# Patient Record
Sex: FEMALE | URBAN - METROPOLITAN AREA
[De-identification: names, ages, dates, MRNs, and addresses within clinical notes are randomized per-mention and may not be internally consistent; named-entity substitution may affect disease eponyms.]

---

## 2017-01-26 ENCOUNTER — OFFICE VISIT - HEALTHEAST (OUTPATIENT)
Dept: FAMILY MEDICINE | Facility: CLINIC | Age: 47
End: 2017-01-26

## 2017-01-26 DIAGNOSIS — E66.9 OBESITY: ICD-10-CM

## 2017-01-26 DIAGNOSIS — Z01.419 WELL FEMALE EXAM WITH ROUTINE GYNECOLOGICAL EXAM: ICD-10-CM

## 2017-01-26 DIAGNOSIS — Z00.00 HEALTH CARE MAINTENANCE: ICD-10-CM

## 2017-01-26 DIAGNOSIS — I10 HYPERTENSION: ICD-10-CM

## 2017-01-26 DIAGNOSIS — Z12.31 ENCOUNTER FOR SCREENING MAMMOGRAM FOR BREAST CANCER: ICD-10-CM

## 2017-01-26 DIAGNOSIS — M54.9 BACK PAIN: ICD-10-CM

## 2017-01-26 LAB
CHOLEST SERPL-MCNC: 202 MG/DL
FASTING STATUS PATIENT QL REPORTED: YES
HDLC SERPL-MCNC: 38 MG/DL
LDLC SERPL CALC-MCNC: 92 MG/DL
TRIGL SERPL-MCNC: 361 MG/DL

## 2017-01-26 ASSESSMENT — MIFFLIN-ST. JEOR: SCORE: 1246.37

## 2017-01-27 ENCOUNTER — COMMUNICATION - HEALTHEAST (OUTPATIENT)
Dept: FAMILY MEDICINE | Facility: CLINIC | Age: 47
End: 2017-01-27

## 2017-01-27 DIAGNOSIS — E03.9 HYPOTHYROIDISM: ICD-10-CM

## 2017-02-01 LAB
BKR LAB AP ABNORMAL BLEEDING: NO
BKR LAB AP BIRTH CONTROL/HORMONES: NORMAL
BKR LAB AP CERVICAL APPEARANCE: NORMAL
BKR LAB AP GYN ADEQUACY: NORMAL
BKR LAB AP GYN INTERPRETATION: NORMAL
BKR LAB AP HPV REFLEX: NORMAL
BKR LAB AP LMP: NORMAL
BKR LAB AP PATIENT STATUS: NORMAL
BKR LAB AP PREVIOUS ABNORMAL: NORMAL
BKR LAB AP PREVIOUS NORMAL: NORMAL
HIGH RISK?: NO
HPV INTERPRETATION - HISTORICAL: NORMAL
HPV INTERPRETER - HISTORICAL: NORMAL
PATH REPORT.COMMENTS IMP SPEC: NORMAL
RESULT FLAG (HE HISTORICAL CONVERSION): NORMAL

## 2017-02-02 ENCOUNTER — COMMUNICATION - HEALTHEAST (OUTPATIENT)
Dept: FAMILY MEDICINE | Facility: CLINIC | Age: 47
End: 2017-02-02

## 2017-02-02 ENCOUNTER — AMBULATORY - HEALTHEAST (OUTPATIENT)
Dept: NURSING | Facility: CLINIC | Age: 47
End: 2017-02-02

## 2017-02-14 ENCOUNTER — COMMUNICATION - HEALTHEAST (OUTPATIENT)
Dept: TELEHEALTH | Facility: CLINIC | Age: 47
End: 2017-02-14

## 2017-02-14 ENCOUNTER — HOSPITAL ENCOUNTER (OUTPATIENT)
Dept: MAMMOGRAPHY | Facility: HOSPITAL | Age: 47
Discharge: HOME | End: 2017-02-14
Attending: NURSE PRACTITIONER

## 2017-02-14 DIAGNOSIS — Z12.31 ENCOUNTER FOR SCREENING MAMMOGRAM FOR BREAST CANCER: ICD-10-CM

## 2017-03-10 ENCOUNTER — OFFICE VISIT - HEALTHEAST (OUTPATIENT)
Dept: FAMILY MEDICINE | Facility: CLINIC | Age: 47
End: 2017-03-10

## 2017-03-10 ENCOUNTER — COMMUNICATION - HEALTHEAST (OUTPATIENT)
Dept: FAMILY MEDICINE | Facility: CLINIC | Age: 47
End: 2017-03-10

## 2017-03-10 DIAGNOSIS — D50.9 IRON DEFICIENCY ANEMIA: ICD-10-CM

## 2017-03-10 DIAGNOSIS — I10 HYPERTENSION: ICD-10-CM

## 2017-03-10 DIAGNOSIS — E03.9 HYPOTHYROIDISM: ICD-10-CM

## 2017-03-10 ASSESSMENT — MIFFLIN-ST. JEOR: SCORE: 1249.09

## 2017-04-05 ENCOUNTER — AMBULATORY - HEALTHEAST (OUTPATIENT)
Dept: LAB | Facility: CLINIC | Age: 47
End: 2017-04-05

## 2017-04-05 DIAGNOSIS — E03.9 HYPOTHYROIDISM: ICD-10-CM

## 2017-04-05 DIAGNOSIS — D50.9 IRON DEFICIENCY ANEMIA: ICD-10-CM

## 2017-04-05 DIAGNOSIS — I10 HYPERTENSION: ICD-10-CM

## 2018-07-26 ENCOUNTER — OFFICE VISIT - HEALTHEAST (OUTPATIENT)
Dept: FAMILY MEDICINE | Facility: CLINIC | Age: 48
End: 2018-07-26

## 2018-07-26 ENCOUNTER — COMMUNICATION - HEALTHEAST (OUTPATIENT)
Dept: SCHEDULING | Facility: CLINIC | Age: 48
End: 2018-07-26

## 2018-07-26 ENCOUNTER — COMMUNICATION - HEALTHEAST (OUTPATIENT)
Dept: FAMILY MEDICINE | Facility: CLINIC | Age: 48
End: 2018-07-26

## 2018-07-26 DIAGNOSIS — Z12.31 VISIT FOR SCREENING MAMMOGRAM: ICD-10-CM

## 2018-07-26 DIAGNOSIS — D64.9 ANEMIA: ICD-10-CM

## 2018-07-26 DIAGNOSIS — L60.3 DYSTROPHIC NAIL: ICD-10-CM

## 2018-07-26 DIAGNOSIS — Z00.00 HEALTH MAINTENANCE EXAMINATION: ICD-10-CM

## 2018-07-26 DIAGNOSIS — E03.9 HYPOTHYROIDISM: ICD-10-CM

## 2018-07-26 DIAGNOSIS — I10 HYPERTENSION: ICD-10-CM

## 2018-07-26 DIAGNOSIS — I10 ESSENTIAL HYPERTENSION: ICD-10-CM

## 2018-07-26 LAB
ALBUMIN SERPL-MCNC: 3.8 G/DL (ref 3.5–5)
ALP SERPL-CCNC: 100 U/L (ref 45–120)
ALT SERPL W P-5'-P-CCNC: 16 U/L (ref 0–45)
ANION GAP SERPL CALCULATED.3IONS-SCNC: 10 MMOL/L (ref 5–18)
AST SERPL W P-5'-P-CCNC: 15 U/L (ref 0–40)
BASOPHILS # BLD AUTO: 0.1 THOU/UL (ref 0–0.2)
BASOPHILS # BLD AUTO: 0.1 THOU/UL (ref 0–0.2)
BASOPHILS NFR BLD AUTO: 1 % (ref 0–2)
BASOPHILS NFR BLD AUTO: 1 % (ref 0–2)
BILIRUB SERPL-MCNC: 0.3 MG/DL (ref 0–1)
BUN SERPL-MCNC: 11 MG/DL (ref 8–22)
CALCIUM SERPL-MCNC: 9.1 MG/DL (ref 8.5–10.5)
CHLORIDE BLD-SCNC: 104 MMOL/L (ref 98–107)
CHOLEST SERPL-MCNC: 199 MG/DL
CO2 SERPL-SCNC: 22 MMOL/L (ref 22–31)
CREAT SERPL-MCNC: 0.65 MG/DL (ref 0.6–1.1)
EOSINOPHIL # BLD AUTO: 0.1 THOU/UL (ref 0–0.4)
EOSINOPHIL # BLD AUTO: 0.1 THOU/UL (ref 0–0.4)
EOSINOPHIL NFR BLD AUTO: 1 % (ref 0–6)
EOSINOPHIL NFR BLD AUTO: 1 % (ref 0–6)
ERYTHROCYTE [DISTWIDTH] IN BLOOD BY AUTOMATED COUNT: 18.3 % (ref 11–14.5)
ERYTHROCYTE [DISTWIDTH] IN BLOOD BY AUTOMATED COUNT: 19.9 % (ref 11–14.5)
FASTING STATUS PATIENT QL REPORTED: YES
GFR SERPL CREATININE-BSD FRML MDRD: >60 ML/MIN/1.73M2
GLUCOSE BLD-MCNC: 89 MG/DL (ref 70–125)
HCT VFR BLD AUTO: 29.3 % (ref 35–47)
HCT VFR BLD AUTO: 30.9 % (ref 35–47)
HDLC SERPL-MCNC: 38 MG/DL
HGB BLD-MCNC: 8.9 G/DL (ref 12–16)
HGB BLD-MCNC: 9.1 G/DL (ref 12–16)
IRON SATN MFR SERPL: 4 % (ref 20–50)
IRON SERPL-MCNC: 22 UG/DL (ref 42–175)
LDLC SERPL CALC-MCNC: 88 MG/DL
LYMPHOCYTES # BLD AUTO: 3.3 THOU/UL (ref 0.8–4.4)
LYMPHOCYTES # BLD AUTO: 3.3 THOU/UL (ref 0.8–4.4)
LYMPHOCYTES NFR BLD AUTO: 33 % (ref 20–40)
LYMPHOCYTES NFR BLD AUTO: 35 % (ref 20–40)
MCH RBC QN AUTO: 19.2 PG (ref 27–34)
MCH RBC QN AUTO: 20.1 PG (ref 27–34)
MCHC RBC AUTO-ENTMCNC: 29.4 G/DL (ref 32–36)
MCHC RBC AUTO-ENTMCNC: 30.3 G/DL (ref 32–36)
MCV RBC AUTO: 63 FL (ref 80–100)
MCV RBC AUTO: 68 FL (ref 80–100)
MONOCYTES # BLD AUTO: 0.5 THOU/UL (ref 0–0.9)
MONOCYTES # BLD AUTO: 0.5 THOU/UL (ref 0–0.9)
MONOCYTES NFR BLD AUTO: 5 % (ref 2–10)
MONOCYTES NFR BLD AUTO: 5 % (ref 2–10)
NEUTROPHILS # BLD AUTO: 5.6 THOU/UL (ref 2–7.7)
NEUTROPHILS # BLD AUTO: 5.8 THOU/UL (ref 2–7.7)
NEUTROPHILS NFR BLD AUTO: 58 % (ref 50–70)
NEUTROPHILS NFR BLD AUTO: 60 % (ref 50–70)
OVALOCYTES: ABNORMAL
PATH REPORT.MICROSCOPIC SPEC OTHER STN: ABNORMAL
PLAT MORPH BLD: NORMAL
PLATELET # BLD AUTO: 357 THOU/UL (ref 140–440)
PLATELET # BLD AUTO: 376 THOU/UL (ref 140–440)
PMV BLD AUTO: 7.9 FL (ref 7–10)
PMV BLD AUTO: 9.8 FL (ref 8.5–12.5)
POLYCHROMASIA BLD QL SMEAR: ABNORMAL
POTASSIUM BLD-SCNC: 4.5 MMOL/L (ref 3.5–5)
PROT SERPL-MCNC: 7.9 G/DL (ref 6–8)
RBC # BLD AUTO: 4.52 MILL/UL (ref 3.8–5.4)
RBC # BLD AUTO: 4.61 MILL/UL (ref 3.8–5.4)
SODIUM SERPL-SCNC: 136 MMOL/L (ref 136–145)
TIBC SERPL-MCNC: 546 UG/DL (ref 313–563)
TRANSFERRIN SERPL-MCNC: 437 MG/DL (ref 212–360)
TRIGL SERPL-MCNC: 365 MG/DL
TSH SERPL DL<=0.005 MIU/L-ACNC: 2.67 UIU/ML (ref 0.3–5)
WBC: 9.6 THOU/UL (ref 4–11)
WBC: 9.8 THOU/UL (ref 4–11)

## 2018-07-26 RX ORDER — FERROUS SULFATE 325(65) MG
1 TABLET ORAL 2 TIMES DAILY
Qty: 60 TABLET | Refills: 1 | Status: SHIPPED | OUTPATIENT
Start: 2018-07-26

## 2018-07-26 RX ORDER — LISINOPRIL 10 MG/1
10 TABLET ORAL DAILY
Qty: 90 TABLET | Refills: 3 | Status: SHIPPED | OUTPATIENT
Start: 2018-07-26

## 2018-07-26 RX ORDER — CICLOPIROX 80 MG/ML
SOLUTION TOPICAL
Qty: 6.6 ML | Refills: 3 | Status: SHIPPED | OUTPATIENT
Start: 2018-07-26

## 2018-07-26 RX ORDER — ATORVASTATIN CALCIUM 20 MG/1
20 TABLET, FILM COATED ORAL DAILY
Qty: 90 TABLET | Refills: 0 | Status: SHIPPED | OUTPATIENT
Start: 2018-07-26

## 2018-07-26 ASSESSMENT — MIFFLIN-ST. JEOR: SCORE: 1249.88

## 2018-07-27 ENCOUNTER — COMMUNICATION - HEALTHEAST (OUTPATIENT)
Dept: FAMILY MEDICINE | Facility: CLINIC | Age: 48
End: 2018-07-27

## 2018-07-27 LAB
LAB AP CHARGES (HE HISTORICAL CONVERSION): NORMAL
PATH REPORT.COMMENTS IMP SPEC: NORMAL
PATH REPORT.COMMENTS IMP SPEC: NORMAL
PATH REPORT.FINAL DX SPEC: NORMAL
PATH REPORT.MICROSCOPIC SPEC OTHER STN: NORMAL
PATH REPORT.RELEVANT HX SPEC: NORMAL

## 2018-08-09 ENCOUNTER — OFFICE VISIT - HEALTHEAST (OUTPATIENT)
Dept: FAMILY MEDICINE | Facility: CLINIC | Age: 48
End: 2018-08-09

## 2018-08-09 DIAGNOSIS — N92.0 HEAVY PERIODS: ICD-10-CM

## 2018-08-09 DIAGNOSIS — I10 HTN (HYPERTENSION): ICD-10-CM

## 2018-08-09 DIAGNOSIS — M54.9 UPPER BACK PAIN: ICD-10-CM

## 2018-08-09 DIAGNOSIS — E78.1 HYPERTRIGLYCERIDEMIA: ICD-10-CM

## 2018-08-09 DIAGNOSIS — D50.0 IRON DEFICIENCY ANEMIA DUE TO CHRONIC BLOOD LOSS: ICD-10-CM

## 2018-08-09 LAB
BASOPHILS # BLD AUTO: 0 THOU/UL (ref 0–0.2)
BASOPHILS NFR BLD AUTO: 0 % (ref 0–2)
EOSINOPHIL # BLD AUTO: 0.1 THOU/UL (ref 0–0.4)
EOSINOPHIL NFR BLD AUTO: 1 % (ref 0–6)
ERYTHROCYTE [DISTWIDTH] IN BLOOD BY AUTOMATED COUNT: 18 % (ref 11–14.5)
HCT VFR BLD AUTO: 29 % (ref 35–47)
HGB BLD-MCNC: 9 G/DL (ref 12–16)
LYMPHOCYTES # BLD AUTO: 3.2 THOU/UL (ref 0.8–4.4)
LYMPHOCYTES NFR BLD AUTO: 37 % (ref 20–40)
MCH RBC QN AUTO: 20.6 PG (ref 27–34)
MCHC RBC AUTO-ENTMCNC: 31 G/DL (ref 32–36)
MCV RBC AUTO: 67 FL (ref 80–100)
MONOCYTES # BLD AUTO: 0.4 THOU/UL (ref 0–0.9)
MONOCYTES NFR BLD AUTO: 5 % (ref 2–10)
NEUTROPHILS # BLD AUTO: 5 THOU/UL (ref 2–7.7)
NEUTROPHILS NFR BLD AUTO: 57 % (ref 50–70)
PLATELET # BLD AUTO: 346 THOU/UL (ref 140–440)
PMV BLD AUTO: 8.5 FL (ref 7–10)
RBC # BLD AUTO: 4.36 MILL/UL (ref 3.8–5.4)
WBC: 8.7 THOU/UL (ref 4–11)

## 2018-08-09 ASSESSMENT — MIFFLIN-ST. JEOR: SCORE: 1245.46

## 2018-08-10 ENCOUNTER — COMMUNICATION - HEALTHEAST (OUTPATIENT)
Dept: FAMILY MEDICINE | Facility: CLINIC | Age: 48
End: 2018-08-10

## 2021-05-30 VITALS — BODY MASS INDEX: 31.06 KG/M2 | HEIGHT: 60 IN | WEIGHT: 158.2 LBS

## 2021-05-30 VITALS — WEIGHT: 157.6 LBS | HEIGHT: 60 IN | BODY MASS INDEX: 30.94 KG/M2

## 2021-06-01 VITALS — HEIGHT: 60 IN | BODY MASS INDEX: 31.09 KG/M2 | WEIGHT: 158.38 LBS

## 2021-06-01 VITALS — HEIGHT: 60 IN | BODY MASS INDEX: 30.9 KG/M2 | WEIGHT: 157.4 LBS

## 2021-06-08 NOTE — PROGRESS NOTES
Assessment:     1. Well female exam with routine gynecological exam  Healthy female exam completed today. Labs updated as below. Discussed self breast exams and completing these. Additionally discussed healthy lifestyle modifications such as a balanced diet and regular exercise. She was encouraged to eat a low carb low sugar diet that is high in protein. Patient adivsed to follow up in 4 weeks for BP check and medication check and in 1 year for annual physical.   - Basic Metabolic Panel  - HM2(CBC w/o Differential)  - Lipid Cascade  - Thyroid [place]  - Gynecologic Cytology (PAP Smear)    2. Hypertension  New diagnosis of hypertension today. She has had 2 separate visits with elevated BP. I will start patient on lisinopril. I discussed the possible side effects of this medication as as well as the risks vs. Benefits of this medication. She was advised to follow up for a nurse only visit in 5-7 days and in clinic for an office visit in about 1 month. Labs were drawn as below.   - Basic Metabolic Panel  - HM2(CBC w/o Differential)  - Lipid Cascade  - Thyroid Cascade  - lisinopril (PRINIVIL,ZESTRIL) 20 MG tablet; Take 1 tablet (20 mg total) by mouth daily.  Dispense: 30 tablet; Refill: 11    3. Encounter for screening mammogram for breast cancer  Order placed.  - Mammo Screening Bilateral; Future    4. Health care maintenance  Given today.   - Tdap vaccine,  6yo or older,  IM    5. Back pain  Back pain likely from poor sleeping position and weakned muscles. Muscles are tight and spasmed across her mid back. Discussed measures to improve the back pain with the patient. Encouraged NSAIDS as needed, ice, heat, and stretching. I believe patient would benefit from PT this order placed today.   - Ambulatory referral to Physical Therapy    6. Obesity  The following high BMI interventions were performed this visit: encouragement to exercise, dietary management education, guidance, and counseling and lifestyle education  regarding diet      Subjective:      Maria Ines Herrera is a 46 y.o. female who presents for an annual exam. The patient is sexually active. The patient participates in regular exercise: no. The patient reports that there is not domestic violence in her life. She also is having neck and back pain that is concerning today. She reports falling on  this pain initially started in her hip and has continued. Prior to this fall she had pain occassionally when waking up in the morning.  Since the fall she feels the pain has been worse. It is worse at night and in the morning. This pain improves through the day after she has been moving around. She has used ibuprofen for the pain as well as ice and heat to help with the pain.     Healthy Habits:   Regular Exercise: No  Sunscreen Use: Sometimes  Healthy Diet: No  Dental Visits Regularly: No  Seat Belt: Yes  Sexually active: Yes  Self Breast Exam Monthly:Yes  Hemoccults: No  Flex Sig: No  Colonoscopy: No  Lipid Profile: No  Glucose Screen: No  Prevention of Osteoporosis: Yes  Last Dexa: N/A  Guns at Home:  No      Immunization History   Administered Date(s) Administered     Tdap 2017     Immunization status: up to date and documented, missing doses of TDAP. Will administer today.     No exam data present    Gynecologic History  Patient's last menstrual period was 2017.  Contraception: none  Last Pap: 2+ years ago. Results were: normal  Last mammogram: awhile ago. Results were: normal      OB History   No data available       Current Outpatient Prescriptions   Medication Sig Dispense Refill     lisinopril (PRINIVIL,ZESTRIL) 20 MG tablet Take 1 tablet (20 mg total) by mouth daily. 30 tablet 11     No current facility-administered medications for this visit.      Past Medical History   Diagnosis Date     Hyperlipidemia      Past Surgical History   Procedure Laterality Date      section       Cholecystectomy       Tubal ligation       Review of patient's  allergies indicates no known allergies.  Family History   Problem Relation Age of Onset     Hyperlipidemia Mother      Osteoporosis Mother      Deep vein thrombosis Father      Social History     Social History     Marital status:      Spouse name: N/A     Number of children: N/A     Years of education: N/A     Occupational History     Not on file.     Social History Main Topics     Smoking status: Never Smoker     Smokeless tobacco: Not on file     Alcohol use No     Drug use: No     Sexual activity: Yes     Partners: Male     Other Topics Concern     Not on file     Social History Narrative       Review of Systems  General:  Denies problem  Eyes: Denies problem  Ears/Nose/Throat: Denies problem  Cardiovascular: Denies problem, except for concerns of elevated BP.  Respiratory:  Denies problem  Gastrointestinal:  Denies problem  Genitourinary: Denies problem  Musculoskeletal:  upper/ mid back pain, sharp in nature. Worse at night and in the AM. Ok throughout the day.   Skin: Denies problem  Neurologic: Denies problem  Psychiatric: Denies problem  Endocrine: Denies problem  Heme/Lymphatic: Denies problem   Allergic/Immunologic: Denies problem        Objective:         Vitals:    01/26/17 1037 01/26/17 1122   BP: 154/90 178/90   Pulse: 70    Resp: 18    Weight: 157 lb 9.6 oz (71.5 kg)    Height: 5' (1.524 m)      Body mass index is 30.78 kg/(m^2).    Physical Exam:  General Appearance: Alert, cooperative, no distress, appears stated age  Head: Normocephalic, without obvious abnormality, atraumatic  Eyes: PERRL, conjunctiva/corneas clear, EOM's intact  Ears: Normal TM's and external ear canals, both ears  Nose: Nares normal, septum midline,mucosa normal, no drainage  Throat: Lips, mucosa, and tongue normal; teeth and gums normal  Neck: Supple, symmetrical, trachea midline, no adenopathy;  thyroid: not enlarged, symmetric, no tenderness/mass/nodules  Back: Symmetric, no curvature, ROM normal, no CVA tenderness.  Muscle spasm present across thoracic spine at area where she experiences pain.   Lungs: Clear to auscultation bilaterally, respirations unlabored  Breasts: No breast masses, tenderness, asymmetry, or nipple discharge.  Heart: Regular rate and rhythm, S1 and S2 normal, no murmur, rub, or gallop  Abdomen: Soft, non-tender, bowel sounds active all four quadrants,  no masses, no organomegaly  Pelvic:Normally developed genitalia with no external lesions or eruptions. Vagina and cervix show no lesions, inflammation, discharge or tenderness. No cystocele, No rectocele. Uterus neutral, mobile, non-tender.  No adnexal mass or tenderness.  Extremities: Extremities normal, atraumatic, no cyanosis or edema  Skin: Skin color, texture, turgor normal, no rashes or lesions  Lymph nodes: Cervical, supraclavicular, and axillary nodes normal  Neurologic: Normal

## 2021-06-09 NOTE — PROGRESS NOTES
Assessment/Plan:         1. Hypothyroidism  Thyroid Stimulating Hormone (TSH)    levothyroxine (SYNTHROID) 75 MCG tablet   2. Hypertension  HM2(CBC w/o Differential)    Basic Metabolic Panel    lisinopril (PRINIVIL,ZESTRIL) 20 MG tablet   3. Iron deficiency anemia  HM2(CBC w/o Differential)    ferrous sulfate 325 (65 FE) MG tablet     Patient was provided with refills of her medications today. As she has not been taking her medications for the last 1 week I will have patient return to the clinic in 4 weeks for a lab only visit for blood work. Anticipating that she will likely need a higher dose of the levothyroxine I increased the dose from 50mcq to 75mcq. She has been tolerating the medication well and has not had any side effects. She was also given a refill of the lisinopril as she would prefer to have a 90 day supply vs. 30. As for her iron supplement, this was prescribed today as well as she will likely not take this if it is not prescribed. Medication dose adjustment will be completed as needed. Advised in office follow up in 3 months.         Subjective:      Maria Ines Herrera is a 46 y.o. female who presents for follow up from blood work and medications. With her at this appointment is a [place] interpretor and her . She feels well and reports that she is tolerating the medication well. At her physical in January her Blood pressure was elevated. She was started on Lisinopril for hypertension. She was also started on levothyroxine for her hypothyroidism as her TSH was 13.38.  Her CBC also showed what was likely an iron deficiency anemia.  She is started also on iron supplementation.  She reports that she has not taken these medications since she ran out of the medications from her pill bottle.  She did not realize that there was refills available for her at the pharmacy.  She denies any bothersome side effects from these medications. She denies a cough, weight loss or gain, skin changes, or neurologic  "changes. Her  notes that he can tell a \"big\" difference in her mood and affect since starting these medications.     The following portions of the patient's history were reviewed and updated as appropriate: allergies, current medications and problem list.    Patient Active Problem List   Diagnosis     Elevated blood pressure     Hypothyroidism     Hypertension     Iron deficiency anemia     Current Outpatient Prescriptions   Medication Sig     ferrous sulfate 325 (65 FE) MG tablet Take 1 tablet (325 mg total) by mouth 2 (two) times a day.     lisinopril (PRINIVIL,ZESTRIL) 20 MG tablet Take 1 tablet (20 mg total) by mouth daily.     levothyroxine (SYNTHROID) 75 MCG tablet Take 1 tablet (75 mcg total) by mouth daily.       Review of Systems   Pertinent items are noted in HPI.      Objective:        Visit Vitals     /86 (Patient Site: Right Arm, Patient Position: Sitting, Cuff Size: Adult Regular)     Pulse 60     Resp 16     Ht 5' (1.524 m)     Wt 158 lb 3.2 oz (71.8 kg)     BMI 30.9 kg/m2       General appearance: alert, appears stated age and cooperative  Head: Normocephalic, without obvious abnormality, atraumatic  Neck: no adenopathy, no carotid bruit, no JVD, supple, symmetrical, trachea midline and thyroid not enlarged, symmetric, no tenderness/mass/nodules  Lungs: clear to auscultation bilaterally  Heart: regular rate and rhythm, S1, S2 normal, no murmur, click, rub or gallop  Extremities: extremities normal, atraumatic, no cyanosis or edema  Skin: Skin color, texture, turgor normal. No rashes or lesions  Neurologic: Grossly normal  "

## 2021-06-15 PROBLEM — E03.9 HYPOTHYROIDISM: Status: ACTIVE | Noted: 2017-03-10

## 2021-06-15 PROBLEM — I10 HYPERTENSION: Status: ACTIVE | Noted: 2017-03-10

## 2021-06-15 PROBLEM — D50.9 IRON DEFICIENCY ANEMIA: Status: ACTIVE | Noted: 2017-03-10

## 2021-06-19 NOTE — PROGRESS NOTES
FEMALE PREVENTATIVE EXAM    Assessment and Plan:       1. Visit for screening mammogram  Encouraged her to complete annual mammograms, referral given  - Mammo Screening Bilateral; Future    2. Anemia  Recheck CBC today.  She is no longer taking iron.  States she is no longer having heavy periods.  Would refer for colonoscopy if iron deficiency anemia present.  - HM1(CBC and Differential)    3. Essential hypertension  She self discontinued her lisinopril.  Recommend she restart 10 mg daily, which is lower than previous dose.  Would likely plan to have her return in a couple of weeks to recheck blood pressure but will determine final plan once lab results are available.  Discussed reasons for treating hypertension.  - Comprehensive Metabolic Panel  - Lipid Cascade    4. Hypothyroidism  Recheck thyroid labs today and if TSH is elevated plan to restart a lower dose of levothyroxine as the 75 microgram dose caused her to feel anxious and not be able to sleep.  - Thyroid Cascade    5. Dystrophic nail  Referral to podiatry.  At this point I would not necessarily recommend an oral treatment, as changes on her nail are pretty minimal.  She can try topical antifungal if covered by insurance.  Prescription sent.  Plans to also see podiatry for their opinion.  - Ambulatory referral to Podiatry    6. Annual exam  Pap and immunizations up-to-date.  Repeat Pap in 2022, sooner if concerns.  Mammogram advised as above.  Discussed colonoscopy which would be recommended at 48, unless iron deficiency anemia in which case I would order it now.        Next follow up:  No Follow-up on file.    Immunization Review  Adult Imm Review: No immunizations due today  BMI: 30.93, exercise and weight loss advised    I discussed the following with the patient:   Adult Healthy Living: Importance of regular exercise  Herbal medications/alternative medical therapies      Subjective:   Chief Complaint: Maria Ines Herrera is an 47 y.o. female here for a  "preventative health visit.     HPI: Piotr 47-year-old here for annual physical.  Last seen about a year and half ago was diagnosed with hypertension, microcytic anemia, and hypothyroidism.  Started on lisinopril 20 mg for hypertension and levothyroxine 50 mcg daily with an increase to 75 mg daily for hypothyroidism.  Stopped taking her lisinopril because she was feeling fine.  Stopped taking her levothyroxine because she felt anxious and could not sleep.  On discussion today regarding anemia, her periods were heavy the year she was diagnosed with microcytic anemia, but she states periods are no longer heavy.    Regarding health maintenance, she is due for mammogram.  Pap is up-to-date, normal 2017, negative HPV, no history of abnormal.  She has never had colon cancer screening, turns 48 in October.  No family history of colon cancer that she knows of.  Medications are up-to-date.    Healthy Habits  Are you taking a daily aspirin? No  Do you typically exercising at least 40 min, 3-4 times per week?  NO 2 times/week  Do you usually eat at least 4 servings of fruit and vegetables a day, include whole grains and fiber and avoid regularly eating high fat foods? NO does not eat much fruit and vegetables  Have you had an eye exam in the past two years? Yes  Do you see a dentist twice per year? Yes  Do you have any concerns regarding sleep? No    Safety Screen  If you own firearms, are they secured in a locked gun cabinet or with trigger locks?   Do you feel you are safe where you are living?: Yes (7/26/2018  9:00 AM)  Do you feel you are safe in your relationship(s)?: Yes (7/26/2018  9:00 AM)    Review of Systems:  Please see above.  In addition, she wonders if she has fungus on her great toenail.  States the area appeared \"bad\" and she cut it out.  Now she has some bruising at the base of the nail.  She wonders if there is anything she can use for the toenail.  The rest of the review of systems are negative for all " systems.     Cancer Screening       Status Date      MAMMOGRAM Overdue 2/14/2018      Done 2/14/2017 MAMMO SCREENING BILATERAL     Patient has more history with this topic...    PAP SMEAR Next Due 1/26/2022      Done 1/26/2017 GYNECOLOGIC CYTOLOGY (PAP SMEAR)              History     Reviewed By Date/Time Sections Reviewed    Yoli Vincent CMA 7/26/2018  8:58 AM Tobacco            Objective:   Vital Signs:   Visit Vitals     /90 (Patient Site: Left Arm, Patient Position: Sitting, Cuff Size: Adult Regular)     Pulse 64     Temp 98.9  F (37.2  C) (Oral)     Resp 16     Ht 5' (1.524 m)     Wt 158 lb 6 oz (71.8 kg)     LMP 07/09/2018 (Exact Date)     Breastfeeding No     BMI 30.93 kg/m2          PHYSICAL EXAM  GENERAL:  Pleasant, well-appearing woman in no acute distress.  VITAL SIGNS:  Reviewed.  HEENT:  Pupils are equal, round, and reactive to light.  Sclerae and  conjunctivae clear.  TMs are clear bilaterally.  Oropharynx is clear with  moist mucous membranes.  NECK:  Supple without lymphadenopathy or thyromegaly.  No carotid bruits.  CARDIOVASCULAR:  Heart regular rate and rhythm without murmur.  Normal S1 and  S2.  LUNGS:  Clear to auscultation bilaterally without wheezes or crackles.  Good  air movement throughout.  BREASTS:  Normal contours.  No skin dimpling, nipple retraction or nipple  discharge.  Palpation reveals no masses, no supraclavicular or axillary  lymphadenopathy.    ABDOMEN:  Soft, nontender, and nondistended without  guarding or rebound.  No organomegaly.  PELVIS:  Normal female external genitalia.  No skin lesions.     Bimanual exam reveals no  cervical motion tenderness, no uterine or adnexal masses or tenderness.  EXTREMITIES:  Warm and well perfused without edema. Dorsalis pedis pulses easily palable and symmetric bilaterally.  1 of her great toenails has slight separation of the nail plate from the nail bed, some bruising at the proximal aspect of the nail, and partial self removal  appears to be healing well.  NEURO: Alert and oriented. Grossly nonfocal.  PSYCHIATRIC: Presents on time and well groomed.  Normal speach and thought content.  Full affect.  No abnormal movements or behaviors noted.               Medication List          These changes are accurate as of 7/26/18  9:43 AM.  If you have any questions, ask your nurse or doctor.               START taking these medications          ciclopirox 8 % solution   Also known as:  PENLAC   INSTRUCTIONS:  Apply over nail and surrounding skin. Apply daily over previous coat. After seven (7) days, may remove with alcohol and continue cycle.   Started by:  Eden Rodriguez MD           lisinopril 10 MG tablet   Also known as:  PRINIVIL,ZESTRIL   INSTRUCTIONS:  Take 1 tablet (10 mg total) by mouth daily.   Started by:  Eden Rodriguez MD                Where to Get Your Medications      These medications were sent to [facility] [address] [place] [address] AT [address] [address]     Phone:  [phone]      ciclopirox 8 % solution     lisinopril 10 MG tablet             Additional Screenings Completed Today:

## 2021-06-26 NOTE — PROGRESS NOTES
Progress Notes by Eden Rodriguez MD at 8/9/2018  9:40 AM     Author: Eden Rodriguez MD Service: -- Author Type: Physician    Filed: 8/11/2018  8:42 AM Encounter Date: 8/9/2018 Status: Signed    : Eden Rodriguez MD (Physician)       ASSESSMENT & PLAN:  1. Upper back pain  Intermittent for two years. No midline pain, no radicular symptoms. Recommend a trial pf PT. Follow up if not improving.   - Ambulatory referral to Physical Therapy    2. Iron deficiency anemia, most likely due to heavy periods  Reports very heavy periods for quite some time. She will start the oral iron recommended after last visit. Discussed referral to gyn to discuss options for menorrhagia such as hormonal contraception, IUD, ablation, hysterectomy. She would like to wait on gyn referral. Advised her to at least complete pelvic ultrasound, which was ordered, to look for cause other than perimenopausal bleeding. Follow up in 1-2 months for recheck visit with labs (CBC, iron), sooner if feeling worse.  - HM1(CBC and Differential)  - US Pelvis With Transvaginal Non OB; Future    3. HTN  Well controlled after restarting lisinopril at lower dose of 10mg daily. She had many questions about HTN today, which were answered. Discussed diagnosis of HTN and why she needs to continue on medication, including short and long term risks of untreated HTN. She would like to work on low salt diet, increased exercise, weight loss, but agrees to continue on medication for now. Recheck in 2 months, sooner if concerns.     4. Hypertriglyceridemia  She would like to try to avoid starting atorvastatin. Discussed dietary changes, increased exercise, and weight loss. Follow up for recheck visit with fasting labs in 2 months. If not improved with lifestyle changes, recommend starting atorvastatin.    Patient Instructions   Recheck triglycerides and anemia in 2 months. Schedule an appointment.   Take iron twice daily every day.  Erna Vasquez  "jennifer análisis otros nombres?  Panel de lípidos, panel de lipoproteínas en ayunas.   Qué es jennifer análisis?  Jennifer análisis mide la cantidad de triglicéridos que hay en gutiérrez radha.  Los triglicéridos son cleopatra de los varios tipos de grasas que hay en gutiérrez radha. Otros tipos son el colesterol de lipoproteínas de baja densidad (\"LDL\", por nicole siglas en inglés) o \"sushma\" y el colesterol de lipoproteínas de mikie densidad (\"HDL\", por nicole siglas en inglés) o \"finley\".  Conocer gutiérrez nivel de triglicéridos es importante, especialmente si usted tiene diabetes, sobrepeso o es fumador, o si la mayoría del tiempo está inactivo. Los niveles altos de triglicéridos pueden ponerle en un mayor riesgo de tener un ataque al corazón o un ataque cerebral.    Jennifer análisis forma parte de un aldair de análisis de colesterol y grasas en la radha denominadas panel de lipoproteínas en ayunas o panel de lípidos. Se recomienda a todos los adultos que se rafa jennifer panel, al menos, val vez cada adam años, o según les sugiera gutiérrez proveedor de atención médica.  Conocer gutiérrez nivel de triglicéridos ayuda a gutiérrez proveedor de atención médica a sugerir cambios saludables en gutiérrez dieta o estilo de gregoria. Si usted tiene un nivel de triglicéridos de alto a muy alto, es más probable que gutiérrez proveedor de atención médica le recete medicamentos para reducir nicole triglicéridos o gutiérrez colesterol LDL.   Por qué adrián realizarme jennifer análisis?  Le pueden realizar jennifer análisis pam parte de un chequeo de rutina. También es posible que necesite jennifer análisis si usted tiene sobrepeso, dillon demasiado alcohol, hace ejercicio en raras ocasiones o tiene otras afecciones, pam presión arterial mikie (hipertensión) o diabetes.  Si está tomando medicamentos para reducir el colesterol, le pueden realizar jennifer análisis para amauri qué tan amelia está funcionando gutiérrez tratamiento.   Qué otros análisis podrían hacerme junto con jennifer?   Gutiérrez proveedor de atención médica le pedirá exámenes de detección para " el colesterol LDL, HDL y total.   Qué significan los resultados de mi análisis?  Muchos aspectos pueden afectar los resultados de nicole análisis de laboratorio. Estos incluyen el método que usa cada laboratorio para realizar el análisis. Aunque los resultados de nicole análisis jamel diferentes del valor normal, es posible que usted no tenga ningún problema. Para saber qué significan nicole resultados, hable con gutiérrez proveedor de atención médica.  Los resultados se indican en miligramos por decilitro (mg/dL). Los niveles normales de triglicéridos son menores de 150 mg/dL.  Aquí se menciona cómo se clasifican los valores más altos:    De 150 a 199 mg/dL: alto en el límite de lo normal    De 200 a 499 mg/dL: alto    De 500 mg/dL o más: muy alto  Si usted tiene un nivel alto de triglicéridos, es mayor gutiérrez riesgo de tener un ataque al corazón y un ataque cerebral.  Un nivel de triglicéridos superior a 150 mg/dL también significa que es posible que usted tenga un mayor riesgo de síndrome metabólico, que es un aldair de síntomas que incluyen presión arterial mikie, nivel alto de azúcar en la radha y nivel alto de grasas corporales alrededor de la cintura. Estos síntomas patel sido vinculados con un mayor riesgo de diabetes, enfermedad cardíaca y ataque cerebral.  Los niveles altos de triglicéridos también pueden ser causados por determinadas enfermedades o afecciones hereditarias.  Si gutiérrez nivel de triglicéridos es superior a 200 mg/dL, gutiérrez proveedor de atención médica puede recomendarle que:    Baje de peso    Limite el consumo de alimentos ricos en grasas que contienen grasas saturadas, que son grasas animales que se encuentran en la carne, la mantequilla y la leche entera.    Limite el consumo de grasas trans, que se encuentran en muchos alimentos procesados, pam las carolina fritas y las galletas compradas.    Reduzca el consumo de bebidas con azúcares agregados, pam los refrescos    Limite gutiérrez consumo de alcohol    Deje de fumar    Mida  gutiérrez presión arterial    Teri ejercicio emi, al menos, 30 minutos al día, adam días a la semana    Limite el consumo de calorías provenientes de la grasa en gutiérrez dieta, a un porcentaje del 25% al 35% de gutiérrez consumo total  Si gutiérrez nivel de triglicéridos es extremadamente alto, superior a 500 mg/dL, usted puede tener un riesgo adicional de problemas en gutiérrez páncreas y es probable que necesite medicamentos para reducir nicole niveles junto con los cambios de dieta y estilo de gregoria que le recomienden.   Cómo se realiza asim análisis?  Para el análisis, se requiere val muestra de radha, que se extrae a través de val aguja que se coloca en val vena de gutiérrez brazo.   Implica asim análisis algún riesgo?  Louise val muestra de radha con vla aguja implica riesgos que incluyen sangrado, infección, moretones o sensación de mareo. Cuando pinchen gutiérrez brazo con la aguja, es posible que sienta val leve sensación de escozor o dolor. Después, el sitio puede estar levemente dolorido.   Qué cosas podrían afectar los resultados de mi análisis?  No estar en ayunas emi el tiempo requerido antes del análisis puede afectar nicole resultados. Determinados medicamentos pueden afectar nicole resultados, al igual que el consumo de alcohol.   Cómo me preparo para asim análisis?  Si se hace asim análisis pam parte de un examen de detección del colesterol, no deberá comer ni beber nada, excepto agua, emi 9 a 14 horas antes del análisis. Además, asegúrese de que gutiérrez proveedor de atención médica sepa todos los medicamentos, los productos a base de hierbas, las vitaminas y los suplementos que usted está tomando. Onarga incluye los medicamentos que no necesitan receta y cualquier droga ilícita que pudiera estar usando.    [id number] [employer] [facility]. [address] [place] [id number]. Todos los derechos reservados. Esta información no pretende sustituir la atención médica profesional. Sólo gutiérrez médico puede diagnosticar y tratar un problema de  nayeli.            Orders Placed This Encounter   Procedures   ? US Pelvis With Transvaginal Non OB     Standing Status:   Future     Standing Expiration Date:   8/9/2019     Order Specific Question:   Is the patient pregnant?     Answer:   No     Order Specific Question:   Can the procedure be changed per Radiologist protocol?     Answer:   Yes   ? HM1 (CBC with Diff)   ? Ambulatory referral to Physical Therapy     Referral Priority:   Routine     Referral Type:   Physical Therapy     Referral Reason:   Evaluation and Treatment     Requested Specialty:   Physical Therapy     Number of Visits Requested:   1     Medications Discontinued During This Encounter   Medication Reason   ? lisinopril (PRINIVIL,ZESTRIL) 20 MG tablet        No Follow-up on file.    CHIEF COMPLAINT:  Chief Complaint   Patient presents with   ? Follow-up     lab results. Needs clarification on medications.        HISTORY OF PRESENT ILLNESS:  Maria Ines is a 47 y.o. female presenting to the clinic today with  for follow up of HTN, and abnormal lab results from recent physical.    HTN: Started on lisinopril about 2 years ago, but self-discontinued prior to last appointment. BP was elevated, so lisinopril was restarted but at a lower dose of 10mg daily. BP looks good today. She only started it 2 days ago. Prior to that was having some frontal HAs, which have since resolved.     Elevated triglycerides: Noted on labs at recent physical. She eats a lot of rice and tortilla in her diet. Does not eat a lot of sugars/sweets.     Upper back pain: Had been seen for this after a fall in December 2016. Continues to be bothered by intermittent pain. Feels like tight muscles. Not midline. No numbness, tingling, or weakness.     Anemia: Microcytic anemia, low iron level. At her last visit, she reported a long history of menorrhagia, but reported periods had been lighter/normal for the past year, but on clarification today, she only had one  lighter/normal cycle, and has otherwise continued to have heavy menses, including over the past year. Menses come monthly. No irregular spotting, just heavy.    REVIEW OF SYSTEMS:   All other systems are negative.    PFSH:  reviewed    TOBACCO USE:  History   Smoking Status   ? Never Smoker   Smokeless Tobacco   ? Never Used       VITALS:  Vitals:    08/09/18 0942   BP: 128/74   Patient Site: Right Arm   Patient Position: Sitting   Cuff Size: Adult Regular   Pulse: 72   Resp: 16   Weight: 157 lb 6.4 oz (71.4 kg)   Height: 5' (1.524 m)     Wt Readings from Last 3 Encounters:   08/09/18 157 lb 6.4 oz (71.4 kg)   07/26/18 158 lb 6 oz (71.8 kg)   03/10/17 158 lb 3.2 oz (71.8 kg)     Body mass index is 30.74 kg/(m^2).    PHYSICAL EXAM:  GENERAL: Pleasant, well-appearing patient in no acute distress.   HEENT: Pupils equal round reactive to light. Sclerae and conjunctivae clear. Oropharynx is clear with moist mucous membranes.   NECK: Supple without lymphadenopathy, no carotid bruits   CARDIOVASCULAR: Heart regular rate and rhythm without murmur normal S1-S2   LUNGS: Clear to auscultation bilaterally, good air movement throughout   EXTREMITIES Warm and well-perfused without edema.    NEURO: Alert and oriented. Grossly nonfocal.   PSYCHIATRIC: Presents on time and well groomed. Normal speech and thought content. Full affect. No abnormal movements or behaviors noted.  MSK: No midline cervical or thoracic spine tenderness to palpation.   BACK: No CVA tenderness      QUALITY MEASURES:  The following high BMI interventions were performed this visit: encouragement to exercise and weight monitoring      MEDICATIONS:  Current Outpatient Prescriptions   Medication Sig Dispense Refill   ? ciclopirox (PENLAC) 8 % solution Apply over nail and surrounding skin. Apply daily over previous coat. After seven (7) days, may remove with alcohol and continue cycle. 6.6 mL 3   ? ferrous sulfate 325 (65 FE) MG tablet Take 1 tablet (325 mg total)  by mouth 2 (two) times a day. 60 tablet 1   ? atorvastatin (LIPITOR) 20 MG tablet Take 1 tablet (20 mg total) by mouth daily. 90 tablet 0   ? lisinopril (PRINIVIL,ZESTRIL) 10 MG tablet Take 1 tablet (10 mg total) by mouth daily. 90 tablet 3     No current facility-administered medications for this visit.

## 2021-07-03 NOTE — ADDENDUM NOTE
Addendum Note by Serenity Hood RT (R) at 7/26/2018 10:21 AM     Author: Serenity Hood RT (R) Service: -- Author Type: Radiologic Technologist    Filed: 7/26/2018 10:21 AM Encounter Date: 7/26/2018 Status: Signed    : Serenity Hood RT (R) (Radiologic Technologist)    Addended by: SERENITY HOOD on: 7/26/2018 10:21 AM        Modules accepted: Orders

## 2021-07-03 NOTE — ADDENDUM NOTE
Addendum Note by Eden Rodriguez MD at 7/26/2018  7:08 PM     Author: Eden Rodriguez MD Service: -- Author Type: Physician    Filed: 7/26/2018  7:08 PM Encounter Date: 7/26/2018 Status: Signed    : Eden Rodriguez MD (Physician)    Addended by: EDEN RODRIGUEZ on: 7/26/2018 07:08 PM        Modules accepted: Orders

## 2021-07-03 NOTE — ADDENDUM NOTE
Addendum Note by Eden Rodriguez MD at 7/26/2018 10:19 AM     Author: Eden Rodriguez MD Service: -- Author Type: Physician    Filed: 7/26/2018 10:19 AM Encounter Date: 7/26/2018 Status: Signed    : Eden Rodriguez MD (Physician)    Addended by: EDEN RODRIGUEZ on: 7/26/2018 10:19 AM        Modules accepted: Orders

## 2021-07-03 NOTE — ADDENDUM NOTE
Addendum Note by Carito Barajas CNP at 1/27/2017  1:01 PM     Author: Carito Barajas CNP Service: -- Author Type: Nurse Practitioner    Filed: 1/27/2017  1:01 PM Encounter Date: 1/27/2017 Status: Signed    : Carito Barajas CNP (Nurse Practitioner)    Addended by: CARITO BARAJAS on: 1/27/2017 01:01 PM        Modules accepted: Orders

## 2021-07-03 NOTE — ADDENDUM NOTE
Addendum Note by Yanira Barragan CMA at 1/27/2017  8:07 AM     Author: Yanira Barragan CMA Service: -- Author Type: Certified Medical Assistant    Filed: 1/27/2017  8:07 AM Encounter Date: 1/27/2017 Status: Signed    : Yanira Barragan CMA (Certified Medical Assistant)    Addended by: YANIRA BARRAGAN on: 1/27/2017 08:07 AM        Modules accepted: Orders

## 2021-07-13 ENCOUNTER — RECORDS - HEALTHEAST (OUTPATIENT)
Dept: ADMINISTRATIVE | Facility: CLINIC | Age: 51
End: 2021-07-13

## 2021-08-22 ENCOUNTER — HEALTH MAINTENANCE LETTER (OUTPATIENT)
Age: 51
End: 2021-08-22

## 2021-10-17 ENCOUNTER — HEALTH MAINTENANCE LETTER (OUTPATIENT)
Age: 51
End: 2021-10-17

## 2022-10-01 ENCOUNTER — HEALTH MAINTENANCE LETTER (OUTPATIENT)
Age: 52
End: 2022-10-01

## 2023-10-21 ENCOUNTER — HEALTH MAINTENANCE LETTER (OUTPATIENT)
Age: 53
End: 2023-10-21